# Patient Record
Sex: FEMALE | Race: WHITE | NOT HISPANIC OR LATINO | URBAN - METROPOLITAN AREA
[De-identification: names, ages, dates, MRNs, and addresses within clinical notes are randomized per-mention and may not be internally consistent; named-entity substitution may affect disease eponyms.]

---

## 2022-06-08 DIAGNOSIS — R19.5 POSITIVE COLORECTAL CANCER SCREENING USING COLOGUARD TEST: Primary | ICD-10-CM

## 2022-06-23 ENCOUNTER — TELEPHONE (OUTPATIENT)
Dept: GASTROENTEROLOGY | Facility: CLINIC | Age: 62
End: 2022-06-23
Payer: COMMERCIAL

## 2022-06-23 DIAGNOSIS — R19.5 POSITIVE COLORECTAL CANCER SCREENING USING COLOGUARD TEST: Primary | ICD-10-CM

## 2022-06-23 NOTE — TELEPHONE ENCOUNTER
----- Message from Lauren Mckay MA sent at 6/22/2022  4:28 PM CDT -----    ----- Message -----  From: Kamille Rosa  Sent: 6/22/2022   4:21 PM CDT  To: Stuart BENEDICT Staff    Robert Haney is calling to get an update on her sooner appt request. Please give her a call back at [phone] she said her test came back positive.

## 2022-06-23 NOTE — TELEPHONE ENCOUNTER
She is being seen for positive cologuard. Are we direct scheduling these or should I send her for OV?  NATHALYL, CMA

## 2022-06-24 RX ORDER — MONTELUKAST SODIUM 10 MG/1
TABLET ORAL
COMMUNITY
Start: 2022-06-14

## 2022-06-24 RX ORDER — PHENOBARBITAL 100 MG/1
TABLET ORAL
COMMUNITY
Start: 2022-02-03

## 2022-06-24 RX ORDER — ESCITALOPRAM OXALATE 20 MG/1
20 TABLET ORAL DAILY
COMMUNITY
Start: 2022-04-18

## 2022-06-29 DIAGNOSIS — R19.5 POSITIVE COLORECTAL CANCER SCREENING USING COLOGUARD TEST: Primary | ICD-10-CM

## 2022-06-29 RX ORDER — FLUTICASONE PROPIONATE 50 MCG
1 SPRAY, SUSPENSION (ML) NASAL DAILY
COMMUNITY

## 2022-06-29 RX ORDER — ALPRAZOLAM 0.25 MG/1
TABLET ORAL
COMMUNITY
Start: 2022-04-25

## 2022-06-29 RX ORDER — EVOLOCUMAB 140 MG/ML
INJECTION, SOLUTION SUBCUTANEOUS
COMMUNITY
Start: 2022-06-05

## 2022-06-29 RX ORDER — LEVETIRACETAM 500 MG/1
TABLET ORAL
COMMUNITY
Start: 2022-04-10

## 2022-06-29 RX ORDER — ROSUVASTATIN CALCIUM 20 MG/1
TABLET, COATED ORAL
COMMUNITY
Start: 2022-06-21

## 2022-06-29 RX ORDER — LEVOTHYROXINE SODIUM 50 UG/1
TABLET ORAL
COMMUNITY
Start: 2022-03-07

## 2022-06-29 RX ORDER — FLUTICASONE PROPIONATE AND SALMETEROL 100; 50 UG/1; UG/1
1 POWDER RESPIRATORY (INHALATION) 2 TIMES DAILY
COMMUNITY

## 2022-06-30 VITALS — BODY MASS INDEX: 23.56 KG/M2 | WEIGHT: 120 LBS | HEIGHT: 60 IN

## 2022-06-30 RX ORDER — SOD SULF/POT CHLORIDE/MAG SULF 1.479 G
12 TABLET ORAL DAILY
Qty: 24 TABLET | Refills: 0 | Status: SHIPPED | OUTPATIENT
Start: 2022-06-30 | End: 2022-06-30 | Stop reason: CLARIF

## 2022-06-30 RX ORDER — POLYETHYLENE GLYCOL 3350, SODIUM SULFATE ANHYDROUS, SODIUM BICARBONATE, SODIUM CHLORIDE, POTASSIUM CHLORIDE 236; 22.74; 6.74; 5.86; 2.97 G/4L; G/4L; G/4L; G/4L; G/4L
4 POWDER, FOR SOLUTION ORAL ONCE
Qty: 4000 ML | Refills: 0 | Status: SHIPPED | OUTPATIENT
Start: 2022-06-30 | End: 2022-06-30

## 2022-06-30 NOTE — TELEPHONE ENCOUNTER
Patient has seizure disorder. I tried to cancel her Sutab eRx. I sent a GoLytely eRx. Please notify patient she will need the GoLytely prep given her medical history. She should not  the Sutab prep.  NBP

## 2022-07-05 ENCOUNTER — TELEPHONE (OUTPATIENT)
Dept: GASTROENTEROLOGY | Facility: CLINIC | Age: 62
End: 2022-07-05
Payer: COMMERCIAL

## 2022-07-05 DIAGNOSIS — R19.5 POSITIVE COLORECTAL CANCER SCREENING USING COLOGUARD TEST: Primary | ICD-10-CM

## 2022-07-05 NOTE — TELEPHONE ENCOUNTER
[facility]  [address] [place] [address]  Phone: [phone]  Fax: [phone]    History & Physical         Provider: Dr. Heaven Davidson    Patient Name: Robert GONZALEZ (age):1960  61 y.o.           Gender: female   Phone: [phone]     Referring Physician: Kishor Jamison     Vital Signs:   Height - 5'  Weight - 120 lb  BMI -  23.44    Plan: Colonoscopy     Encounter Diagnosis   Name Primary?    Positive colorectal cancer screening using Cologuard test Yes           History:      Past Medical History:   Diagnosis Date    Anxiety disorder, unspecified     Disorder of thyroid, unspecified     High cholesterol     history TIA (transient ischemic attack)     Mild intermittent asthma, uncomplicated     Seizure disorder       Past Surgical History:   Procedure Laterality Date    ANKLE SURGERY Right     CATARACT EXTRACTION Bilateral     CHOLECYSTECTOMY      LASIK Bilateral     lump removed from chest wall      REPAIR OF MENISCUS OF KNEE        Medication List with Changes/Refills   Current Medications    ALPRAZOLAM (XANAX) 0.25 MG TABLET    TAKE 1-2 TABLETS ORALLY TWICE A DAY 30 DAYS    ESCITALOPRAM OXALATE (LEXAPRO) 20 MG TABLET    Take 20 mg by mouth once daily.    FLUTICASONE PROPIONATE (FLONASE) 50 MCG/ACTUATION NASAL SPRAY    1 spray by Each Nostril route once daily.    FLUTICASONE-SALMETEROL DISKUS INHALER 100-50 MCG    Inhale 1 puff into the lungs 2 (two) times daily. Controller    LEVETIRACETAM (KEPPRA) 500 MG TAB    TAKE 1 TABLET BY MOUTH EVERY MORNING AND THEN 2 IN THE AFTERNOON    LEVOTHYROXINE (SYNTHROID) 50 MCG TABLET    TAKE 1 TABLET BY MOUTH EVERY DAY ON EMPTY STOMACH IN THE MORNING    MONTELUKAST (SINGULAIR) 10 MG TABLET    TAKE 1 TABLET BY MOUTH EVERY DAY FOR 90 DAYS    PHENOBARBITAL (LUMINAL) 100 MG TABLET    TAKE 1.5 TABLETS BY MOUTH ONCE EVERY DAY** OK EARLY FILL     REPATHA SYRINGE 140 MG/ML SYRG    INJECT 1 ML SUBCUTANEOUS TWICE A WEEK 30 DAY(S)    ROSUVASTATIN (CRESTOR) 20 MG TABLET    TAKE 1 TABLET BY MOUTH EVERY DAY FOR 90 DAYS      Review of patient's allergies indicates:   Allergen Reactions    Penicillins       Family History   Problem Relation Age of Onset    Pancreatic cancer Mother     Hyperlipidemia Mother     Diabetes Father     Leukemia Father     Bladder Cancer Brother     Thyroid cancer Brother     Breast cancer Maternal Grandmother     Stroke Maternal Grandfather     Diabetes Paternal Grandmother       Social History     Tobacco Use    Smoking status: Former Smoker    Smokeless tobacco: Never Used   Substance Use Topics    Alcohol use: Not Currently    Drug use: Never        Physical Examination:     General Appearance:___________________________  HEENT: _____________________________________  Abdomen:____________________________________  Heart:________________________________________  Lungs:_______________________________________  Extremities:___________________________________  Skin:_________________________________________  Endocrine:____________________________________  Genitourinary:_________________________________  Neurological:__________________________________      Patient has been evaluated immediately prior to sedation and is medically cleared for endoscopy with IVCS as an ASA class: ______      Physician Signature: _________________________       Date: ________  Time: ________

## 2022-09-08 ENCOUNTER — TELEPHONE (OUTPATIENT)
Dept: GASTROENTEROLOGY | Facility: CLINIC | Age: 62
End: 2022-09-08
Payer: COMMERCIAL

## 2022-09-08 NOTE — TELEPHONE ENCOUNTER
Discussed with patient that due to her history of seizures, Sutab is contraindicated. Will come  instructions for prep.

## 2022-09-08 NOTE — TELEPHONE ENCOUNTER
----- Message from Fernanda Armas sent at 9/8/2022  8:30 AM CDT -----  Regarding: Colonoscopy  Contact: patient  Per phone call with patient, she stated that she would like to take the pill for the cleanser before she take her colonoscopy.  She wants to know this would work.  Please return call at [phone] (home).        Thanks,  SJ

## 2022-11-09 ENCOUNTER — TELEPHONE (OUTPATIENT)
Dept: GASTROENTEROLOGY | Facility: CLINIC | Age: 62
End: 2022-11-09
Payer: COMMERCIAL

## 2022-11-09 VITALS — WEIGHT: 120 LBS | HEIGHT: 60 IN | BODY MASS INDEX: 23.56 KG/M2

## 2022-11-09 DIAGNOSIS — R19.5 POSITIVE COLORECTAL CANCER SCREENING USING COLOGUARD TEST: Primary | ICD-10-CM

## 2022-11-09 NOTE — TELEPHONE ENCOUNTER
"[facility]  [address] [place] [address]  Phone: [phone]  Fax: [phone]    History & Physical         Provider: Dr. Heaven Davidson    Patient Name: Robert GONZALEZ (age):1960  62 y.o.           Gender: female   Phone: [phone]     Referring Physician: Kishor Jamison     Vital Signs:   Height - 5' 0"  Weight - 120 lbs  BMI -  23.44    Plan: Colonoscopy     Encounter Diagnosis   Name Primary?    Positive colorectal cancer screening using Cologuard test Yes           History:      Past Medical History:   Diagnosis Date    Anxiety disorder, unspecified     Disorder of thyroid, unspecified     High cholesterol     history TIA (transient ischemic attack)     Mild intermittent asthma, uncomplicated     Seizure disorder       Past Surgical History:   Procedure Laterality Date    ANKLE SURGERY Right     CATARACT EXTRACTION Bilateral     CHOLECYSTECTOMY      LASIK Bilateral     lump removed from chest wall      REPAIR OF MENISCUS OF KNEE        Medication List with Changes/Refills   Current Medications    ALPRAZOLAM (XANAX) 0.25 MG TABLET    TAKE 1-2 TABLETS ORALLY TWICE A DAY 30 DAYS    ESCITALOPRAM OXALATE (LEXAPRO) 20 MG TABLET    Take 20 mg by mouth once daily.    FLUTICASONE PROPIONATE (FLONASE) 50 MCG/ACTUATION NASAL SPRAY    1 spray by Each Nostril route once daily.    FLUTICASONE-SALMETEROL DISKUS INHALER 100-50 MCG    Inhale 1 puff into the lungs 2 (two) times daily. Controller    LEVETIRACETAM (KEPPRA) 500 MG TAB    TAKE 1 TABLET BY MOUTH EVERY MORNING AND THEN 2 IN THE AFTERNOON    LEVOTHYROXINE (SYNTHROID) 50 MCG TABLET    TAKE 1 TABLET BY MOUTH EVERY DAY ON EMPTY STOMACH IN THE MORNING    MONTELUKAST (SINGULAIR) 10 MG TABLET    TAKE 1 TABLET BY MOUTH EVERY DAY FOR 90 DAYS    PHENOBARBITAL (LUMINAL) 100 MG TABLET    TAKE 1.5 TABLETS BY MOUTH ONCE EVERY DAY** OK EARLY FILL    REPATHA " SYRINGE 140 MG/ML SYRG    INJECT 1 ML SUBCUTANEOUS TWICE A WEEK 30 DAY(S)    ROSUVASTATIN (CRESTOR) 20 MG TABLET    TAKE 1 TABLET BY MOUTH EVERY DAY FOR 90 DAYS      Review of patient's allergies indicates:   Allergen Reactions    Penicillins       Family History   Problem Relation Age of Onset    Pancreatic cancer Mother     Hyperlipidemia Mother     Diabetes Father     Leukemia Father     Bladder Cancer Brother     Thyroid cancer Brother     Breast cancer Maternal Grandmother     Stroke Maternal Grandfather     Diabetes Paternal Grandmother       Social History     Tobacco Use    Smoking status: Former    Smokeless tobacco: Never   Substance Use Topics    Alcohol use: Not Currently    Drug use: Never        Physical Examination:     General Appearance:___________________________  HEENT: _____________________________________  Abdomen:____________________________________  Heart:________________________________________  Lungs:_______________________________________  Extremities:___________________________________  Skin:_________________________________________  Endocrine:____________________________________  Genitourinary:_________________________________  Neurological:__________________________________      Patient has been evaluated immediately prior to sedation and is medically cleared for endoscopy with IVCS as an ASA class: ______      Physician Signature: _________________________       Date: ________  Time: ________

## 2022-11-14 ENCOUNTER — OUTSIDE PLACE OF SERVICE (OUTPATIENT)
Dept: GASTROENTEROLOGY | Facility: CLINIC | Age: 62
End: 2022-11-14

## 2022-11-14 ENCOUNTER — TELEPHONE (OUTPATIENT)
Dept: GASTROENTEROLOGY | Facility: CLINIC | Age: 62
End: 2022-11-14

## 2022-11-14 DIAGNOSIS — K63.89 COLONIC MASS: Primary | ICD-10-CM

## 2022-11-14 LAB — CRC RECOMMENDATION EXT: NORMAL

## 2022-11-14 PROCEDURE — 45385 PR COLONOSCOPY,REMV LESN,SNARE: ICD-10-PCS | Mod: ,,, | Performed by: INTERNAL MEDICINE

## 2022-11-14 PROCEDURE — 45380 PR COLONOSCOPY,BIOPSY: ICD-10-PCS | Mod: 59,,, | Performed by: INTERNAL MEDICINE

## 2022-11-14 PROCEDURE — 45380 COLONOSCOPY AND BIOPSY: CPT | Mod: 59,,, | Performed by: INTERNAL MEDICINE

## 2022-11-14 PROCEDURE — 45381 PR COLONOSCPY,FLEX,W/DIR SUBMUC INJECT: ICD-10-PCS | Mod: 51,,, | Performed by: INTERNAL MEDICINE

## 2022-11-14 PROCEDURE — 45381 COLONOSCOPY SUBMUCOUS NJX: CPT | Mod: 51,,, | Performed by: INTERNAL MEDICINE

## 2022-11-14 PROCEDURE — 45385 COLONOSCOPY W/LESION REMOVAL: CPT | Mod: ,,, | Performed by: INTERNAL MEDICINE

## 2022-11-14 NOTE — TELEPHONE ENCOUNTER
Lizette[facility]. Have contact information updated to spouse's number/name per patient request. Not sure if Patricia does that.  [facility]    Contact: Evan Haney [phone]

## 2022-11-14 NOTE — TELEPHONE ENCOUNTER
When the patient was checking in for procedure she had her dog with her.  Her spouse remained with the dog outside of the facility after checking in.  During her colonoscopy she was found to have a colon mass that appeared malignant.  I discussed management options extensively with the patient and her spouse who was at bedside.  She is attempting to get her care taking care of as soon as possible and I explained some of the limitations.  They are to check if [facility] is in their network as that is where she would like to get treated.  We will send that referral once the pathology report has returned.  She asked that I call her spouse directly for all results and scheduling and management.  She asked all referring providers do so as well.  Prior to the procedure, she referred to not having an office visit prior to the colonoscopy. We have it documented in [facility] that an office visit was offered to the patient but she declined. She was scheduled for her colonoscopy in 2019 and today she stated she opted for the stool test rather than colonoscopy at that time. When I asked if the stool test was negative in 2019, she reported the stool test was not done until this year.  She would like blood orders sent to [facility]or White's office.  [facility]    Contact: Evan Haney [phone]

## 2022-11-14 NOTE — TELEPHONE ENCOUNTER
Rec'd incoming call from pt's  wanting to know if pt could take migraine medication as well as inhaler. I told him that it was okay for her to take the medication with a sip of water and use her inhaler. louise

## 2022-11-15 ENCOUNTER — TELEPHONE (OUTPATIENT)
Dept: GASTROENTEROLOGY | Facility: CLINIC | Age: 62
End: 2022-11-15
Payer: COMMERCIAL

## 2022-11-15 DIAGNOSIS — C18.9 MALIGNANT NEOPLASM OF COLON, UNSPECIFIED PART OF COLON: Primary | ICD-10-CM

## 2022-11-15 LAB
ABS NRBC COUNT: 0 X 10 3/UL (ref 0–0.01)
ABSOLUTE BASOPHIL: 0.07 X 10 3/UL (ref 0–0.22)
ABSOLUTE EOSINOPHIL: 0.87 X 10 3/UL (ref 0.04–0.54)
ABSOLUTE IMMATURE GRAN: 0.01 X 10 3/UL (ref 0–0.04)
ABSOLUTE LYMPHOCYTE: 2.01 X 10 3/UL (ref 0.86–4.75)
ABSOLUTE MONOCYTE: 0.48 X 10 3/UL (ref 0.22–1.08)
ALBUMIN SERPL-MCNC: 4.4 G/DL (ref 3.5–5.2)
ALBUMIN/GLOB SERPL ELPH: 1.6 {RATIO} (ref 1–2.7)
ALP ISOS SERPL LEV INH-CCNC: 71 U/L (ref 35–105)
ALT (SGPT): 23 U/L (ref 0–33)
ANION GAP SERPL CALC-SCNC: 16 MMOL/L (ref 8–17)
AST SERPL-CCNC: 23 U/L (ref 0–32)
BASOPHILS NFR BLD: 1.3 % (ref 0.2–1.2)
BILIRUBIN, TOTAL: 0.22 MG/DL (ref 0–1.2)
BUN/CREAT SERPL: 22.4 (ref 6–20)
CALCIUM SERPL-MCNC: 9.5 MG/DL (ref 8.6–10.2)
CARBON DIOXIDE, CO2: 27 MMOL/L (ref 22–29)
CEA: <1.8 NG/ML (ref 0–6.5)
CHLORIDE: 104 MMOL/L (ref 98–107)
CREAT SERPL-MCNC: 0.63 MG/DL (ref 0.5–0.9)
EOSINOPHIL NFR BLD: 15.8 % (ref 0.7–7)
GFR ESTIMATION: 94.08
GLOBULIN: 2.8 G/DL (ref 1.5–4.5)
GLUCOSE: 105 MG/DL (ref 82–115)
HCT VFR BLD AUTO: 38.6 % (ref 37–47)
HGB BLD-MCNC: 12.4 G/DL (ref 12–16)
IMMATURE GRANULOCYTES: 0.2 % (ref 0–0.5)
LYMPHOCYTES NFR BLD: 36.5 % (ref 19.3–53.1)
MCH RBC QN AUTO: 30 PG (ref 27–32)
MCHC RBC AUTO-ENTMCNC: 32.1 G/DL (ref 32–36)
MCV RBC AUTO: 93.5 FL (ref 82–100)
MONOCYTES NFR BLD: 8.7 % (ref 4.7–12.5)
NEUTROPHILS # BLD AUTO: 2.07 X 10 3/UL (ref 2.15–7.56)
NEUTROPHILS NFR BLD: 37.5 % (ref 34–71.1)
NUCLEATED RED BLOOD CELLS: 0 /100 WBC (ref 0–0.2)
PLATELET # BLD AUTO: 210 X 10 3/UL (ref 135–400)
POTASSIUM: 5.2 MMOL/L (ref 3.5–5.1)
PROT SNV-MCNC: 7.2 G/DL (ref 6.4–8.3)
RBC # BLD AUTO: 4.13 X 10 6/UL (ref 4.2–5.4)
RDW-SD: 43.8 FL (ref 37–54)
SODIUM: 147 MMOL/L (ref 136–145)
UREA NITROGEN (BUN): 14.1 MG/DL (ref 8–23)
WBC # BLD: 5.51 X 10 3/UL (ref 4.3–10.8)

## 2022-11-16 NOTE — TELEPHONE ENCOUNTER
iLzette, once the patient's pathology report is finalized tomorrow, scan into Epic and send referral to [facility] with colonoscopy report, pathology report, and blood results.  NBP

## 2022-11-16 NOTE — TELEPHONE ENCOUNTER
I received a message from the pathologist today that the patient's pathology results are consistent with adenocarcinoma.  The [facility] results should be out by tomorrow.  I reviewed the results with the patient and her spouse over the telephone with her permission.  Multiple questions were answered including most of which were answered after her procedure.  She understands we will place an [facility] referral once the pathology report has been finalized and that is expected to be tomorrow.  She will await [facility]'s contact with her directly.  SHAQ

## 2023-01-09 ENCOUNTER — DOCUMENTATION ONLY (OUTPATIENT)
Dept: GASTROENTEROLOGY | Facility: CLINIC | Age: 63
End: 2023-01-09
Payer: COMMERCIAL

## 2023-04-20 ENCOUNTER — TELEPHONE (OUTPATIENT)
Dept: GASTROENTEROLOGY | Facility: CLINIC | Age: 63
End: 2023-04-20
Payer: COMMERCIAL

## 2023-04-21 NOTE — TELEPHONE ENCOUNTER
Notify patient that I reviewed her [facility] records. Does she plan on having her one year follow up colonoscopy locally with me (if so then okay to arrange now for that future date) or at [facility]? I wanted to clarify so it is taken care of accordingly.  SHAQ

## 2023-04-25 ENCOUNTER — TELEPHONE (OUTPATIENT)
Dept: GASTROENTEROLOGY | Facility: CLINIC | Age: 63
End: 2023-04-25
Payer: COMMERCIAL

## 2023-04-25 NOTE — TELEPHONE ENCOUNTER
----- Message from Fernanda Armas sent at 4/25/2023 12:25 PM CDT -----  Regarding: Questions  Contact: patient  Per phone call with patient, he stated that he received a phone call stated that they have some question that needed to be answered.  Please return call at [phone] (home).    Thanks,  JS

## 2023-04-25 NOTE — TELEPHONE ENCOUNTER
I spoke to patient and she plans on having her follow up Colonoscopy at [facility].